# Patient Record
Sex: MALE | Race: WHITE
[De-identification: names, ages, dates, MRNs, and addresses within clinical notes are randomized per-mention and may not be internally consistent; named-entity substitution may affect disease eponyms.]

---

## 2019-06-29 ENCOUNTER — HOSPITAL ENCOUNTER (EMERGENCY)
Dept: HOSPITAL 62 - ER | Age: 19
Discharge: HOME | End: 2019-06-29
Payer: OTHER GOVERNMENT

## 2019-06-29 VITALS — DIASTOLIC BLOOD PRESSURE: 71 MMHG | SYSTOLIC BLOOD PRESSURE: 128 MMHG

## 2019-06-29 DIAGNOSIS — R22.0: ICD-10-CM

## 2019-06-29 DIAGNOSIS — F17.210: ICD-10-CM

## 2019-06-29 DIAGNOSIS — T78.40XA: Primary | ICD-10-CM

## 2019-06-29 DIAGNOSIS — R21: ICD-10-CM

## 2019-06-29 PROCEDURE — 99406 BEHAV CHNG SMOKING 3-10 MIN: CPT

## 2019-06-29 PROCEDURE — 99282 EMERGENCY DEPT VISIT SF MDM: CPT
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
